# Patient Record
(demographics unavailable — no encounter records)

---

## 2025-04-05 NOTE — HISTORY OF PRESENT ILLNESS
[Normal] : Normal [Brushing teeth] : Brushing teeth [Playtime (60 min/d)] : Playtime 60 min a day [Appropiate parent-child-sibling interaction] : Appropriate parent-child-sibling interaction [Parent has appropriate responses to behavior] : Parent has appropriate responses to behavior [No difficulties with Homework] : No difficulties with homework [Adequate performance] : Adequate performance [Adequate attention] : Adequate attention [Car seat in back seat] : Car seat in back seat [de-identified] : well balanced and varied